# Patient Record
Sex: MALE | Race: WHITE
[De-identification: names, ages, dates, MRNs, and addresses within clinical notes are randomized per-mention and may not be internally consistent; named-entity substitution may affect disease eponyms.]

---

## 2019-06-17 ENCOUNTER — HOSPITAL ENCOUNTER (EMERGENCY)
Dept: HOSPITAL 62 - ER | Age: 49
Discharge: HOME | End: 2019-06-17
Payer: COMMERCIAL

## 2019-06-17 VITALS — SYSTOLIC BLOOD PRESSURE: 145 MMHG | DIASTOLIC BLOOD PRESSURE: 80 MMHG

## 2019-06-17 DIAGNOSIS — M25.519: ICD-10-CM

## 2019-06-17 DIAGNOSIS — M79.602: ICD-10-CM

## 2019-06-17 DIAGNOSIS — M54.2: ICD-10-CM

## 2019-06-17 DIAGNOSIS — S16.1XXA: ICD-10-CM
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
## 2019-06-17 DIAGNOSIS — I44.4: ICD-10-CM

## 2019-06-17 DIAGNOSIS — R07.89: Primary | ICD-10-CM

## 2019-06-17 DIAGNOSIS — I10: ICD-10-CM

## 2019-06-17 LAB
ADD MANUAL DIFF: NO
ALBUMIN SERPL-MCNC: 4.9 G/DL (ref 3.5–5)
ALP SERPL-CCNC: 41 U/L (ref 38–126)
ALT SERPL-CCNC: 46 U/L (ref 21–72)
ANION GAP SERPL CALC-SCNC: 9 MMOL/L (ref 5–19)
AST SERPL-CCNC: 28 U/L (ref 17–59)
BASOPHILS # BLD AUTO: 0 10^3/UL (ref 0–0.2)
BASOPHILS NFR BLD AUTO: 0.4 % (ref 0–2)
BILIRUB DIRECT SERPL-MCNC: 0.3 MG/DL (ref 0–0.4)
BILIRUB SERPL-MCNC: 0.5 MG/DL (ref 0.2–1.3)
BUN SERPL-MCNC: 21 MG/DL (ref 7–20)
CALCIUM: 9.8 MG/DL (ref 8.4–10.2)
CHLORIDE SERPL-SCNC: 103 MMOL/L (ref 98–107)
CO2 SERPL-SCNC: 27 MMOL/L (ref 22–30)
EOSINOPHIL # BLD AUTO: 0.2 10^3/UL (ref 0–0.6)
EOSINOPHIL NFR BLD AUTO: 2.7 % (ref 0–6)
ERYTHROCYTE [DISTWIDTH] IN BLOOD BY AUTOMATED COUNT: 13.1 % (ref 11.5–14)
GLUCOSE SERPL-MCNC: 86 MG/DL (ref 75–110)
HCT VFR BLD CALC: 45.1 % (ref 37.9–51)
HGB BLD-MCNC: 15.7 G/DL (ref 13.5–17)
LYMPHOCYTES # BLD AUTO: 2.1 10^3/UL (ref 0.5–4.7)
LYMPHOCYTES NFR BLD AUTO: 25.7 % (ref 13–45)
MCH RBC QN AUTO: 30.1 PG (ref 27–33.4)
MCHC RBC AUTO-ENTMCNC: 34.7 G/DL (ref 32–36)
MCV RBC AUTO: 87 FL (ref 80–97)
MONOCYTES # BLD AUTO: 0.8 10^3/UL (ref 0.1–1.4)
MONOCYTES NFR BLD AUTO: 10.1 % (ref 3–13)
NEUTROPHILS # BLD AUTO: 5 10^3/UL (ref 1.7–8.2)
NEUTS SEG NFR BLD AUTO: 61.1 % (ref 42–78)
PLATELET # BLD: 201 10^3/UL (ref 150–450)
POTASSIUM SERPL-SCNC: 4.5 MMOL/L (ref 3.6–5)
PROT SERPL-MCNC: 7.9 G/DL (ref 6.3–8.2)
RBC # BLD AUTO: 5.2 10^6/UL (ref 4.35–5.55)
SODIUM SERPL-SCNC: 139.2 MMOL/L (ref 137–145)
TOTAL CELLS COUNTED % (AUTO): 100 %
WBC # BLD AUTO: 8.1 10^3/UL (ref 4–10.5)

## 2019-06-17 PROCEDURE — 36415 COLL VENOUS BLD VENIPUNCTURE: CPT

## 2019-06-17 PROCEDURE — 99285 EMERGENCY DEPT VISIT HI MDM: CPT

## 2019-06-17 PROCEDURE — 84484 ASSAY OF TROPONIN QUANT: CPT

## 2019-06-17 PROCEDURE — 93005 ELECTROCARDIOGRAM TRACING: CPT

## 2019-06-17 PROCEDURE — 85025 COMPLETE CBC W/AUTO DIFF WBC: CPT

## 2019-06-17 PROCEDURE — 80053 COMPREHEN METABOLIC PANEL: CPT

## 2019-06-17 PROCEDURE — 93010 ELECTROCARDIOGRAM REPORT: CPT

## 2019-06-17 PROCEDURE — 71045 X-RAY EXAM CHEST 1 VIEW: CPT

## 2019-06-17 NOTE — ER DOCUMENT REPORT
ED Medical Screen (RME)





- General


Chief Complaint: Chest Pain


Stated Complaint: CHEST PAIN


Time Seen by Provider: 06/17/19 18:59


Primary Care Provider: 


GABRIEL DAMON MD [Primary Care Provider] - Follow up as needed


TRAVEL OUTSIDE OF THE [place] IN LAST 30 DAYS: No





- HPI


Notes: 





06/17/19 19:02


Patient is a 48-year-old male with no significant past medical history who 

presents complaining of left-sided chest pain radiates to the shoulder that 

began at 10 AM without precipitating event.  Patient states that the pain is 

primarily near his left shoulder at this point and he is able to ambulate 

without any difficulties or worsening symptoms.  He has not had any shortness of

breath or dyspnea on exertion associated.  He is eating and drinking without 

difficulty.  He is urinating normally.  Denies drug allergies.  Denies any 

prolonged immobilization, distance travel, recent surgery/trauma, personal 

cancer history, hormone use, smoking, or previous DVT/PE.  Denies HA, fever, 

neck pain, URI, SOB, Abd pain, n/v/d, diaphoresis, dysuria, back pain, or rash.





I have treated and performed a rapid initial assessment of this patient.  A 

comprehensive ED assessment and evaluation of the patient, analysis of test 

results and completion of medical decision making process will be conducted by 

additional ED providers.





PHYSICAL EXAMINATION:





GENERAL: Well-appearing, well-nourished and in no acute distress.  A&Ox4.  

Answers questions appropriately.





LUNGS: Breath sounds clear to auscultation bilaterally and equal.  No wheezes 

rales or rhonchi.





HEART: Regular rate and rhythm without murmurs, rubs, gallops.





Extremities:  No cyanosis, clubbing, or edema b/l.  No calf tenderness.  No 

lower extremity asymmetry.





NEUROLOGICAL: Normal speech, normal gait. 





PSYCH: Normal mood, normal affect.








- Related Data


Allergies/Adverse Reactions: 


                                        





No Known Allergies Allergy (Unverified 06/17/19 15:33)


   











Physical Exam





- Vital signs


Vitals: 





                                        











Temp Pulse Resp BP Pulse Ox


 


 98.2 F   96   20   154/64 H  96 


 


 06/17/19 16:20  06/17/19 16:20  06/17/19 16:20  06/17/19 16:20  06/17/19 16:20














Course





- Vital Signs


Vital signs: 





                                        











Temp Pulse Resp BP Pulse Ox


 


 98.2 F   96   20   154/64 H  96 


 


 06/17/19 16:20  06/17/19 16:20  06/17/19 16:20  06/17/19 16:20  06/17/19 16:20














Doctor's Discharge





- Discharge


Referrals: 


GABRIEL DAMON MD [Primary Care Provider] - Follow up as needed

## 2019-06-17 NOTE — RADIOLOGY REPORT (SQ)
EXAM DESCRIPTION:  CHEST SINGLE VIEW



COMPLETED DATE/TIME:  6/17/2019 7:13 pm



REASON FOR STUDY:  CP



COMPARISON:  None.



EXAM PARAMETERS:  NUMBER OF VIEWS: One view.

TECHNIQUE: Single frontal radiographic view of the chest acquired.

RADIATION DOSE: NA

LIMITATIONS: None.



FINDINGS:  LUNGS AND PLEURA: No opacities, masses or pneumothorax. No pleural effusion.

MEDIASTINUM AND HILAR STRUCTURES: No masses.  Contour normal.

HEART AND VASCULAR STRUCTURES: Heart normal in size.  Normal vasculature.

BONES: No acute findings.

HARDWARE: None in the chest.

OTHER: No other significant finding.



IMPRESSION:  NO ACUTE RADIOGRAPHIC FINDING IN THE CHEST.



TECHNICAL DOCUMENTATION:  JOB ID:  [id number]

 2011 [employer]- All Rights Reserved



Reading location - IP/workstation name: [email or web address]

## 2019-06-17 NOTE — ER DOCUMENT REPORT
ED General





- General


Chief Complaint: Chest Pain


Stated Complaint: CHEST PAIN


Time Seen by Provider: 06/17/19 18:59


Primary Care Provider: 


GABRIEL DAMON MD [ACTIVE STAFF] - Follow up in 3-5 days


Mode of Arrival: Ambulatory


Information source: Patient, Relative, [facility] Records


Notes: 





Patient is a 48-year-old male with no significant past medical history who 

presents complaining of left-sided chest pain radiates to the shoulder that 

began at 10 AM without precipitating event.  Patient states that the pain is 

primarily near his left shoulder at this point and he is able to ambulate 

without any difficulties or worsening symptoms.  He has not had any shortness of

breath or dyspnea on exertion associated.  He is eating and drinking without 

difficulty.  He is urinating normally.  Denies drug allergies.  Denies any 

prolonged immobilization, distance travel, recent surgery/trauma, personal 

cancer history, hormone use, smoking, or previous DVT/PE.  Patient also states 

that he is having neck pain with extension and flexion.  He does state that he 

moved a washer and dryer yesterday with his wife and awoke this morning with 

neck pain.  Neck pain is present when he moves them this does radiate to his 

shoulder.  Denies HA, fever, neck pain, URI, SOB, Abd pain, n/v/d, diaphoresis, 

dysuria, back pain, or rash.  Patient does admit that he has not been to a phys

ician in over 5 years.


TRAVEL OUTSIDE OF THE [place] IN LAST 30 DAYS: No





- HPI


Onset: This morning


Onset/Duration: Gradual, Persistent


Quality of pain: Achy


Severity: Mild


Associated symptoms: Chest pain.  denies: Productive cough, Fever, Headache, 

Nausea, Vomiting, Shortness of breath, Sweating, Weakness


Exacerbated by: Movement


Relieved by: Remaining still


Similar symptoms previously: No


Recently seen / treated by doctor: No





- Related Data


Allergies/Adverse Reactions: 


                                        





No Known Allergies Allergy (Unverified 06/17/19 15:33)


   











Past Medical History





- General


Information source: Patient, Relative, [facility] Records





- Social History


Smoking Status: Never Smoker


Frequency of alcohol use: Occasional


Drug Abuse: None


Lives with: Spouse/Significant other


Family History: Reviewed & Not Pertinent


Patient has suicidal ideation: No


Patient has homicidal ideation: No





- Medical History


Medical History: Negative


Renal/ Medical History: Denies: Hx Peritoneal Dialysis





Review of Systems





- Review of Systems


Notes: 





REVIEW OF SYSTEMS:


CONSTITUTIONAL :  Denies fever,  chills, or sweats.  Denies recent illness. 

Denies weight loss, recent hospitalizations. 


EENT: Denies visual changes, eye pain.  Denies sore throat, oral lesions, 

difficulty swallowing.


CARDIOVASCULAR:  + chest pain.  Denies palpitations. Denies lower extremity 

edema.


RESPIRATORY:  Denies cough. Denies shortness of breath, wheezing.


GASTROINTESTINAL:  Denies abdominal pain or distention.  Denies nausea, 

vomiting, or diarrhea.  Denies blood in vomitus, stools, or per rectum.  Denies 

black, tarry stools.  Denies constipation.  


GENITOURINARY:  Denies difficulty urinating, painful urination,  frequency, 

blood in urine, testicular pain or penile discharge.


MUSCULOSKELETAL:  + back, + neck pain or stiffness.  + joint pain denies joint 

swelling.


SKIN:   Denies rash, lesions or sores.


HEMATOLOGIC :   Denies easy bruising or bleeding.


LYMPHATIC:  Denies swollen glands.


NEUROLOGICAL:  Denies confusion or altered mental status.  Denies loss of 

consciousness.  Denies dizziness or lightheadedness.  Denies headache.  Denies 

weakness or paralysis.  Denies problems difficulty with ambulation, slurred 

speech.  Denies sensory loss, numbness, or tingling.  Denies seizures.


PSYCHIATRIC:  Denies anxiety or stress.  Denies depression, suicidal ideation, 

or





Physical Exam





- Vital signs


Vitals: 


                                        











Temp Pulse Resp BP Pulse Ox


 


 98.2 F   96   20   154/64 H  96 


 


 06/17/19 16:20  06/17/19 16:20  06/17/19 16:20  06/17/19 16:20  06/17/19 16:20














- Notes


Notes: 





PHYSICAL EXAMINATION:





GENERAL: Well-appearing, well-nourished and in no acute distress.





HEAD: Atraumatic, normocephalic.





EYES: Pupils equal round and reactive to light, extraocular movements intact, 

sclera anicteric, conjunctiva are normal.





ENT: Nares patent, oropharynx clear without exudates.  Moist mucous membranes.





NECK: Normal range of motion, supple without lymphadenopathy





LUNGS: Breath sounds clear to auscultation bilaterally and equal.  No wheezes 

rales or rhonchi.





HEART: Regular rate and rhythm without murmurs





ABDOMEN: Soft, nontender, nondistended abdomen.  No guarding, no rebound.  No 

masses appreciated.





Musculoskeletal: Normal range of motion, no pitting or edema.  No cyanosis.





NEUROLOGICAL: Cranial nerves grossly intact.  Normal speech, normal gait.  

Normal sensory, motor exams 





PSYCH: Normal mood, normal affect.





SKIN: Warm, Dry, normal turgor, no rashes or lesions noted.





Course





- Re-evaluation


Re-evalutation: 





06/17/19 22:54





Laboratory











  06/17/19 06/17/19 06/17/19





  19:12 19:12 19:12


 


WBC  8.1  


 


RBC  5.20  


 


Hgb  15.7  


 


Hct  45.1  


 


MCV  87  


 


MCH  30.1  


 


MCHC  34.7  


 


RDW  13.1  


 


Plt Count  201  


 


Seg Neutrophils %  61.1  


 


Lymphocytes %  25.7  


 


Monocytes %  10.1  


 


Eosinophils %  2.7  


 


Basophils %  0.4  


 


Absolute Neutrophils  5.0  


 


Absolute Lymphocytes  2.1  


 


Absolute Monocytes  0.8  


 


Absolute Eosinophils  0.2  


 


Absolute Basophils  0.0  


 


Sodium   139.2 


 


Potassium   4.5 


 


Chloride   103 


 


Carbon Dioxide   27 


 


Anion Gap   9 


 


BUN   21 H 


 


Creatinine   0.97 


 


Est GFR ( Amer)   > 60 


 


Est GFR (Non-Af Amer)   > 60 


 


Glucose   86 


 


Calcium   9.8 


 


Total Bilirubin   0.5 


 


Direct Bilirubin   0.3 


 


Neonat Total Bilirubin   Not Reportable 


 


Neonat Direct Bilirubin   Not Reportable 


 


Neonat Indirect Bili   Not Reportable 


 


AST   28 


 


ALT   46 


 


Alkaline Phosphatase   41 


 


Troponin I    < 0.012


 


Total Protein   7.9 


 


Albumin   4.9 














  06/17/19





  21:08


 


WBC 


 


RBC 


 


Hgb 


 


Hct 


 


MCV 


 


MCH 


 


MCHC 


 


RDW 


 


Plt Count 


 


Seg Neutrophils % 


 


Lymphocytes % 


 


Monocytes % 


 


Eosinophils % 


 


Basophils % 


 


Absolute Neutrophils 


 


Absolute Lymphocytes 


 


Absolute Monocytes 


 


Absolute Eosinophils 


 


Absolute Basophils 


 


Sodium 


 


Potassium 


 


Chloride 


 


Carbon Dioxide 


 


Anion Gap 


 


BUN 


 


Creatinine 


 


Est GFR ( Amer) 


 


Est GFR (Non-Af Amer) 


 


Glucose 


 


Calcium 


 


Total Bilirubin 


 


Direct Bilirubin 


 


Neonat Total Bilirubin 


 


Neonat Direct Bilirubin 


 


Neonat Indirect Bili 


 


AST 


 


ALT 


 


Alkaline Phosphatase 


 


Troponin I  < 0.012


 


Total Protein 


 


Albumin 











                                        





Chest X-Ray  06/17/19 19:01


IMPRESSION:  NO ACUTE RADIOGRAPHIC FINDING IN THE CHEST.


 











                                        











Temp Pulse Resp BP Pulse Ox


 


 98.2 F   96   20   154/64 H  96 


 


 06/17/19 16:20  06/17/19 16:20  06/17/19 16:20  06/17/19 16:20  06/17/19 16:20








48-year-old male presents with complaint of chest, neck and left arm pain that 

started this morning while at work.  Patient's pain is worse with movement.  

Does admit to moving a heavy washer and dryer yesterday.  Pain is reproducible 

with neck movement, arm movements and with palpation to the chest.  Vital signs 

reviewed and within normal limits, except for mild hypertension.  EKG was 

obtained which showed the patient to be in normal sinus rhythm.  Patient was 

placed on cardiac monitor.  Previous medical records and nursing notes reviewed.

 He does not appear toxic or dehydrated or in any acute distress.  CBC, CMP, 

cardiac enzymes including delta troponin are unremarkable.  Patient did receive 

Motrin and Valium for his pain and neck stiffness.  We did discuss the 

importance of establishing primary care.





HEART Score:





History-0      


ECG-0      


Age-0      


Risk Factors-1   


Troponin   





Total: 1





If HEART score is = 3 AND both troponin measurements are normal, the 30 day risk

of a major adverse cardiac event (all-cause mortality, myocardial infarction or 

need for coronary revascularization) is < 1% (Sensitivity 100%, %).








Chest pain in a patient without evidence of cardiac or other serious etiology on

workup today. I discussed with patient that, based on their age, risk factors 

and emergency department testing today, the likelihood that their symptoms are 

related to a heart attack is very low (estimated risk of heart attack or death 

over the next 30 days of less than 1%).  The patient demonstrates decision 

making capacity and has verbalized an understanding of these risks to me. Based 

on this,  the patient has chosen to follow-up as an outpatient. Usual chest pain

return precautions reviewed. The patient states understanding and agreement with

this plan.








- Vital Signs


Vital signs: 


                                        











Temp Pulse Resp BP Pulse Ox


 


 98.2 F   96   20   154/64 H  96 


 


 06/17/19 16:20  06/17/19 16:20  06/17/19 16:20  06/17/19 16:20  06/17/19 16:20














- Laboratory


Result Diagrams: 


                                 06/17/19 19:12





                                 06/17/19 19:12


Laboratory results interpreted by me: 


                                        











  06/17/19





  19:12


 


BUN  21 H














- Diagnostic Test


Radiology reviewed: Image reviewed, Reports reviewed





- EKG Interpretation by Me


EKG shows normal: Sinus rhythm


Rate: Normal


Rhythm: NSR


Axis/QRS: LAHB/LAFB


When compared to previous EKG there are: Previous EKG unavailable





Discharge





- Discharge


Clinical Impression: 


 Chest wall pain, Elevated blood pressure reading





Chest pain


Qualifiers:


 Chest pain type: unspecified Qualified Code(s): R07.9 - Chest pain, unspecified





Cervical strain, acute


Qualifiers:


 Encounter type: initial encounter Qualified Code(s): S16.1XXA - Strain of 

muscle, fascia and tendon at neck level, initial encounter





Condition: Good


Disposition: HOME, SELF-CARE


Instructions:  Chest Wall Pain ([facility]), Chest Pain of Unclear Cause ([facility]), Neck 

Injury (Cervical Strain) ([facility])


Additional Instructions: 


You were seen today for chest pain.  The exact cause of your pain is unclear. 

However, based on your cardiac enzyme testing, chest x-ray, and EKG it does not 

appear that it is from an immediately life-threatening cause at this time.  

Although your testing here is normal is critical that you follow-up with your 

primary care physician for continued evaluation of this chest pain and possible 

stress testing.  I recommended you see your physician within the next 24-48 

hours to be evaluated for consideration of a stress test.  Please return to 

emergency department immediately if you have worsening of your chest pain, 

shortness of breath, vomiting, become unable to exert yourself due to pain or 

difficulty breathing, you pass out, or have any pain that radiates into your 

arms, jaw, or back. Please also return if you have any additional symptoms that 

are concerning to you.











Recommendations:


 


It is recommended to followup with a primary care doctor within the next 2 days.

 If you do not have a primary care doctor or you are unable to get an apointment

during that time, I left the number for some internal medicine physicians that 

are affiliated with this [facility].


 


Dr. Flory Gallegos Grover 


[address], [place] [address]


[phone]


 


Dr Sierra


Address: [address] [place] [address] 


[phone]


 


Dr Damon


Address: [address] , [place] [address] 


[phone]


 


Prescriptions: 


Diazepam [Valium 5 mg Tablet] 5 mg PO QIDP PRN #6 tablet


 PRN Reason: 


Ibuprofen [Motrin 600 Mg Tablet] 600 mg PO TID #15 tablet


Forms:  Elevated Blood Pressure


Referrals: 


GABRIEL DAMON MD [ACTIVE STAFF] - Follow up in 3-5 days

## 2019-06-17 NOTE — EKG REPORT
SEVERITY:- ABNORMAL ECG -

SINUS RHYTHM

LAD, CONSIDER LEFT ANTERIOR FASCICULAR BLOCK

:

Confirmed by: Araseli Ricketts MD 17-Jun-2019 23:28:48